# Patient Record
Sex: FEMALE | Race: WHITE | NOT HISPANIC OR LATINO | Employment: FULL TIME | URBAN - METROPOLITAN AREA
[De-identification: names, ages, dates, MRNs, and addresses within clinical notes are randomized per-mention and may not be internally consistent; named-entity substitution may affect disease eponyms.]

---

## 2022-06-22 ENCOUNTER — TELEPHONE (OUTPATIENT)
Dept: SCHEDULING | Facility: IMAGING CENTER | Age: 37
End: 2022-06-22

## 2022-06-26 SDOH — ECONOMIC STABILITY: TRANSPORTATION INSECURITY
IN THE PAST 12 MONTHS, HAS LACK OF RELIABLE TRANSPORTATION KEPT YOU FROM MEDICAL APPOINTMENTS, MEETINGS, WORK OR FROM GETTING THINGS NEEDED FOR DAILY LIVING?: NO

## 2022-06-26 SDOH — ECONOMIC STABILITY: HOUSING INSECURITY
IN THE LAST 12 MONTHS, WAS THERE A TIME WHEN YOU DID NOT HAVE A STEADY PLACE TO SLEEP OR SLEPT IN A SHELTER (INCLUDING NOW)?: NO

## 2022-06-26 SDOH — ECONOMIC STABILITY: HOUSING INSECURITY: IN THE LAST 12 MONTHS, HOW MANY PLACES HAVE YOU LIVED?: 2

## 2022-06-26 SDOH — ECONOMIC STABILITY: TRANSPORTATION INSECURITY
IN THE PAST 12 MONTHS, HAS THE LACK OF TRANSPORTATION KEPT YOU FROM MEDICAL APPOINTMENTS OR FROM GETTING MEDICATIONS?: NO

## 2022-06-26 SDOH — ECONOMIC STABILITY: INCOME INSECURITY: HOW HARD IS IT FOR YOU TO PAY FOR THE VERY BASICS LIKE FOOD, HOUSING, MEDICAL CARE, AND HEATING?: NOT HARD AT ALL

## 2022-06-26 SDOH — HEALTH STABILITY: PHYSICAL HEALTH: ON AVERAGE, HOW MANY MINUTES DO YOU ENGAGE IN EXERCISE AT THIS LEVEL?: 30 MIN

## 2022-06-26 SDOH — ECONOMIC STABILITY: INCOME INSECURITY: IN THE LAST 12 MONTHS, WAS THERE A TIME WHEN YOU WERE NOT ABLE TO PAY THE MORTGAGE OR RENT ON TIME?: NO

## 2022-06-26 SDOH — ECONOMIC STABILITY: FOOD INSECURITY: WITHIN THE PAST 12 MONTHS, THE FOOD YOU BOUGHT JUST DIDN'T LAST AND YOU DIDN'T HAVE MONEY TO GET MORE.: NEVER TRUE

## 2022-06-26 SDOH — ECONOMIC STABILITY: TRANSPORTATION INSECURITY
IN THE PAST 12 MONTHS, HAS LACK OF TRANSPORTATION KEPT YOU FROM MEETINGS, WORK, OR FROM GETTING THINGS NEEDED FOR DAILY LIVING?: NO

## 2022-06-26 SDOH — HEALTH STABILITY: MENTAL HEALTH
STRESS IS WHEN SOMEONE FEELS TENSE, NERVOUS, ANXIOUS, OR CAN'T SLEEP AT NIGHT BECAUSE THEIR MIND IS TROUBLED. HOW STRESSED ARE YOU?: ONLY A LITTLE

## 2022-06-26 SDOH — HEALTH STABILITY: PHYSICAL HEALTH: ON AVERAGE, HOW MANY DAYS PER WEEK DO YOU ENGAGE IN MODERATE TO STRENUOUS EXERCISE (LIKE A BRISK WALK)?: 4 DAYS

## 2022-06-26 SDOH — ECONOMIC STABILITY: FOOD INSECURITY: WITHIN THE PAST 12 MONTHS, YOU WORRIED THAT YOUR FOOD WOULD RUN OUT BEFORE YOU GOT MONEY TO BUY MORE.: NEVER TRUE

## 2022-06-26 ASSESSMENT — LIFESTYLE VARIABLES
SKIP TO QUESTIONS 9-10: 1
HOW MANY STANDARD DRINKS CONTAINING ALCOHOL DO YOU HAVE ON A TYPICAL DAY: 1 OR 2
HOW OFTEN DO YOU HAVE SIX OR MORE DRINKS ON ONE OCCASION: NEVER
HOW OFTEN DO YOU HAVE A DRINK CONTAINING ALCOHOL: 2-3 TIMES A WEEK
AUDIT-C TOTAL SCORE: 3

## 2022-06-26 ASSESSMENT — SOCIAL DETERMINANTS OF HEALTH (SDOH)
HOW MANY DRINKS CONTAINING ALCOHOL DO YOU HAVE ON A TYPICAL DAY WHEN YOU ARE DRINKING: 1 OR 2
HOW OFTEN DO YOU GET TOGETHER WITH FRIENDS OR RELATIVES?: ONCE A WEEK
HOW OFTEN DO YOU ATTENT MEETINGS OF THE CLUB OR ORGANIZATION YOU BELONG TO?: PATIENT DECLINED
HOW OFTEN DO YOU ATTEND CHURCH OR RELIGIOUS SERVICES?: NEVER
WITHIN THE PAST 12 MONTHS, YOU WORRIED THAT YOUR FOOD WOULD RUN OUT BEFORE YOU GOT THE MONEY TO BUY MORE: NEVER TRUE
HOW OFTEN DO YOU HAVE A DRINK CONTAINING ALCOHOL: 2-3 TIMES A WEEK
HOW OFTEN DO YOU HAVE SIX OR MORE DRINKS ON ONE OCCASION: NEVER
DO YOU BELONG TO ANY CLUBS OR ORGANIZATIONS SUCH AS CHURCH GROUPS UNIONS, FRATERNAL OR ATHLETIC GROUPS, OR SCHOOL GROUPS?: NO
HOW OFTEN DO YOU GET TOGETHER WITH FRIENDS OR RELATIVES?: ONCE A WEEK
IN A TYPICAL WEEK, HOW MANY TIMES DO YOU TALK ON THE PHONE WITH FAMILY, FRIENDS, OR NEIGHBORS?: THREE TIMES A WEEK
DO YOU BELONG TO ANY CLUBS OR ORGANIZATIONS SUCH AS CHURCH GROUPS UNIONS, FRATERNAL OR ATHLETIC GROUPS, OR SCHOOL GROUPS?: NO
HOW HARD IS IT FOR YOU TO PAY FOR THE VERY BASICS LIKE FOOD, HOUSING, MEDICAL CARE, AND HEATING?: NOT HARD AT ALL
HOW OFTEN DO YOU ATTEND CHURCH OR RELIGIOUS SERVICES?: NEVER
HOW OFTEN DO YOU ATTENT MEETINGS OF THE CLUB OR ORGANIZATION YOU BELONG TO?: PATIENT DECLINED
IN A TYPICAL WEEK, HOW MANY TIMES DO YOU TALK ON THE PHONE WITH FAMILY, FRIENDS, OR NEIGHBORS?: THREE TIMES A WEEK

## 2022-06-27 ENCOUNTER — OFFICE VISIT (OUTPATIENT)
Dept: MEDICAL GROUP | Facility: LAB | Age: 37
End: 2022-06-27
Payer: COMMERCIAL

## 2022-06-27 VITALS
HEART RATE: 70 BPM | RESPIRATION RATE: 16 BRPM | TEMPERATURE: 98 F | WEIGHT: 171 LBS | OXYGEN SATURATION: 98 % | SYSTOLIC BLOOD PRESSURE: 100 MMHG | BODY MASS INDEX: 25.33 KG/M2 | DIASTOLIC BLOOD PRESSURE: 62 MMHG | HEIGHT: 69 IN

## 2022-06-27 DIAGNOSIS — B35.1 ONYCHOMYCOSIS: ICD-10-CM

## 2022-06-27 DIAGNOSIS — Z76.89 ENCOUNTER TO ESTABLISH CARE: ICD-10-CM

## 2022-06-27 PROBLEM — D62 ACUTE POSTHEMORRHAGIC ANEMIA: Status: ACTIVE | Noted: 2017-06-26

## 2022-06-27 PROCEDURE — 99203 OFFICE O/P NEW LOW 30 MIN: CPT | Performed by: PHYSICIAN ASSISTANT

## 2022-06-27 RX ORDER — FLUCONAZOLE 200 MG/1
200 TABLET ORAL
Qty: 4 TABLET | Refills: 0 | Status: SHIPPED | OUTPATIENT
Start: 2022-06-27 | End: 2023-04-25

## 2022-06-27 RX ORDER — FLUCONAZOLE 200 MG/1
TABLET ORAL
COMMUNITY
Start: 2022-03-18 | End: 2022-06-27 | Stop reason: SDUPTHER

## 2022-06-27 ASSESSMENT — PATIENT HEALTH QUESTIONNAIRE - PHQ9: CLINICAL INTERPRETATION OF PHQ2 SCORE: 0

## 2022-06-27 NOTE — PROGRESS NOTES
"Subjective:     CC:  There were no encounter diagnoses.    HISTORY OF THE PRESENT ILLNESS: Patient is a 37 y.o. female. This pleasant patient is here today to establish care His/her prior PCP was in [facility].    Taking fluconazole for toe fungus  1 tablet per week 200mg x 1 month     planning to get vasectomy -   Will get IUD taken out after 's vasectomy    UTD on labs    Health Maintenance     - Dyslipidemia (30-45): UTD  - Diabetes (HTN, HLD, BMI >25): UTD  - Depression screening (PHQ-2 and/or PHQ-9): neg  - Dental: UTD  - Eye: due ?presbiopia   Diet: \"sweet tooth\" tries to eat balanced diet  Exercise: gym 2-3x week  Substance Use: denies  Tobacco Use/counseling: denies  Safe in relationship: yes  Seat belts, bike helmet, gun safety discussed.  Sun protection used.       Cancer screening  - Cervical CA (21-65): UTD  -  HX Abnormal pap/HPV: none     Infectious disease screening/Immunizations  --Immunizations: UTD      Current Outpatient Medications Ordered in Epic   Medication Sig Dispense Refill   • levonorgestrel (MIRENA) 20 MCG/DAY IUD by Intrauterine route.     • fluconazole (DIFLUCAN) 200 MG Tab        No current Epic-ordered facility-administered medications on file.       Health Maintenance: Completed    ROS:   Gen: no fevers/chills, no changes in weight  Eyes: no changes in vision  ENT: no sore throat, no hearing loss, no bloody nose  Pulm: no sob, no cough  CV: no chest pain, no palpitations  GI: no nausea/vomiting, no diarrhea  : no dysuria  MSk: no myalgias  Skin: no rash  Neuro: no headaches, no numbness/tingling  Heme/Lymph: no easy bruising      Objective:       Exam: /62   Pulse 70   Temp 36.7 °C (98 °F)   Resp 16   Ht 1.753 m (5' 9\")   Wt 77.6 kg (171 lb)   SpO2 98%  Body mass index is 25.25 kg/m².    General: Normal appearing. No distress.  HEENT: Normocephalic. Eyes conjunctiva clear lids without ptosis, pupils equal and reactive to light accommodation, ears normal " shape and contour, canals are clear bilaterally, tympanic membranes are benign, nasal mucosa benign, oropharynx is without erythema, edema or exudates.   Neck: Supple without JVD or bruit. Thyroid is not enlarged.  Pulmonary: Clear to ausculation.  Normal effort. No rales, ronchi, or wheezing.  Cardiovascular: Regular rate and rhythm without murmur. Carotid and radial pulses are intact and equal bilaterally.  Abdomen: Soft, nontender, nondistended. Normal bowel sounds. Liver and spleen are not palpable  Neurologic: Grossly nonfocal  Lymph: No cervical or supraclavicular lymph nodes are palpable  Skin: Warm and dry.  No obvious lesions. Toenail fungus on L 1st and 4th digit  Musculoskeletal: Normal gait. No extremity cyanosis, clubbing, or edema.  Psych: Normal mood and affect. Alert and oriented x3. Judgment and insight is normal.      Assessment & Plan:   37 y.o. female with the following -    1. Encounter to establish care  Labs per orders  Vaccinations per orders  Pt is not due for preventative screenings:     2. Onychomycosis  Chronic condition  CMP reviewed, continue current medication. Advised pt on medication recautions  - fluconazole (DIFLUCAN) 200 MG Tab; Take 1 Tablet by mouth every 7 days.  Dispense: 4 Tablet; Refill: 0      I spent a total of 20 minutes with record review, exam, communication with the patient, communication with other providers, and documentation of this encounter.    No follow-ups on file.    Please note that this dictation was created using voice recognition software. I have made every reasonable attempt to correct obvious errors, but I expect that there are errors of grammar and possibly content that I did not discover before finalizing the note.

## 2023-02-08 ENCOUNTER — OFFICE VISIT (OUTPATIENT)
Dept: MEDICAL GROUP | Facility: LAB | Age: 38
End: 2023-02-08
Payer: COMMERCIAL

## 2023-02-08 VITALS
DIASTOLIC BLOOD PRESSURE: 60 MMHG | WEIGHT: 174 LBS | SYSTOLIC BLOOD PRESSURE: 104 MMHG | HEART RATE: 80 BPM | TEMPERATURE: 98 F | OXYGEN SATURATION: 98 % | HEIGHT: 69 IN | RESPIRATION RATE: 16 BRPM | BODY MASS INDEX: 25.77 KG/M2

## 2023-02-08 DIAGNOSIS — R19.5 LOOSE STOOLS: ICD-10-CM

## 2023-02-08 DIAGNOSIS — M54.6 CHRONIC MIDLINE THORACIC BACK PAIN: ICD-10-CM

## 2023-02-08 DIAGNOSIS — Z63.0 STRESS DUE TO MARITAL PROBLEMS: ICD-10-CM

## 2023-02-08 DIAGNOSIS — G89.29 CHRONIC MIDLINE THORACIC BACK PAIN: ICD-10-CM

## 2023-02-08 PROCEDURE — 99214 OFFICE O/P EST MOD 30 MIN: CPT | Performed by: PHYSICIAN ASSISTANT

## 2023-02-08 RX ORDER — CYCLOBENZAPRINE HCL 5 MG
5 TABLET ORAL 3 TIMES DAILY PRN
Qty: 21 TABLET | Refills: 0 | Status: SHIPPED | OUTPATIENT
Start: 2023-02-08 | End: 2023-02-15

## 2023-02-08 SDOH — SOCIAL STABILITY - SOCIAL INSECURITY: PROBLEMS IN RELATIONSHIP WITH SPOUSE OR PARTNER: Z63.0

## 2023-02-08 ASSESSMENT — PATIENT HEALTH QUESTIONNAIRE - PHQ9: CLINICAL INTERPRETATION OF PHQ2 SCORE: 0

## 2023-02-08 NOTE — PROGRESS NOTES
"Subjective:     CC: several concerns    HPI:   Kenyetta here today with     Back pain  Hx of mild scoliosis. No previous injuries. Usually spasm type pain in thoracic region, worse with bending over. She has tried IcyHot and NSAIDs. Denies radiation, numbness or tingling. Pt has been going to the gym regularly    Runny stools  Symptoms x 1 month. Some bloating/cramping. Sometimes triggered by dairy beverages and oily foods.    Planning for IUD removal in near future, pt is debating with her  about vasectomy vs another IUD    Mood changes  Pt reports some martial stress with her  which occasionally affects her mood. She notes that he has a lot of stress with his own business and does not seem to appreciate her contributions to the household, especially childrearing. She states that she feels safe at home. Declines referral to counseling today    ROS:  Gen: no fevers/chills, no changes in weight  Eyes: no changes in vision  ENT: no sore throat, no hearing loss, no bloody nose  Pulm: no sob, no cough  CV: no chest pain, no palpitations  GI: no nausea/vomiting, no diarrhea  : no dysuria  MSk: no myalgias  Skin: no rash  Neuro: no headaches, no numbness/tingling  Heme/Lymph: no easy bruising    Current Outpatient Medications Ordered in Epic   Medication Sig Dispense Refill    cyclobenzaprine (FLEXERIL) 5 mg tablet Take 1 Tablet by mouth 3 times a day as needed for Muscle Spasms for up to 7 days. 21 Tablet 0    fluconazole (DIFLUCAN) 200 MG Tab Take 1 Tablet by mouth every 7 days. 4 Tablet 0     No current Epic-ordered facility-administered medications on file.         Objective:     Exam:  /60   Pulse 80   Temp 36.7 °C (98 °F)   Resp 16   Ht 1.753 m (5' 9\")   Wt 78.9 kg (174 lb)   SpO2 98%   BMI 25.70 kg/m²  Body mass index is 25.7 kg/m².    Gen: Alert and oriented, No apparent distress.  Neck: Neck is supple without lymphadenopathy.  Lungs: Normal effort, CTA bilaterally, no wheezes, rhonchi, " or rales  CV: Regular rate and rhythm. No murmurs, rubs, or gallops.  Ext: No clubbing, cyanosis, edema.  Back: Full ROM, 5/5 LE strength, sensation intact bilaterally in LE, not tender to palpation over spinous processes, paraspinals neg, SI joint neg, straight leg raise neg, ESPERANZA (hip/SI) test neg      Assessment & Plan:     38 y.o. female with the following -     1. Chronic midline thoracic back pain  Chronic condition  Likely secondary to hx of scoliosis  Trial of muscle relaxers, continue OTC NSAIDS  PT for further eval   - Referral to Physical Therapy  - cyclobenzaprine (FLEXERIL) 5 mg tablet; Take 1 Tablet by mouth 3 times a day as needed for Muscle Spasms for up to 7 days.  Dispense: 21 Tablet; Refill: 0    2. Loose stools  Trial of fiber supplementation as well as eliminating dairy from diet  Discussed red flags and indications for close follow up  F/u PRN    3. Stress due to marital problems  Discussed therapy/counseling options, pt would like to defer at this time, she will contact clinic if she would like a referral     I spent a total of 20 minutes with record review, exam, communication with the patient, communication with other providers, and documentation of this encounter.      Return if symptoms worsen or fail to improve.    Please note that this dictation was created using voice recognition software. I have made every reasonable attempt to correct obvious errors, but there may be errors of grammar and possibly content that I did not discover before finalizing the note.

## 2023-02-15 ENCOUNTER — OFFICE VISIT (OUTPATIENT)
Dept: MEDICAL GROUP | Facility: LAB | Age: 38
End: 2023-02-15
Payer: COMMERCIAL

## 2023-02-15 ENCOUNTER — HOSPITAL ENCOUNTER (OUTPATIENT)
Facility: MEDICAL CENTER | Age: 38
End: 2023-02-15
Attending: PHYSICIAN ASSISTANT
Payer: COMMERCIAL

## 2023-02-15 VITALS
TEMPERATURE: 97.3 F | RESPIRATION RATE: 16 BRPM | BODY MASS INDEX: 25.77 KG/M2 | WEIGHT: 174 LBS | OXYGEN SATURATION: 98 % | HEIGHT: 69 IN | HEART RATE: 68 BPM | DIASTOLIC BLOOD PRESSURE: 60 MMHG | SYSTOLIC BLOOD PRESSURE: 100 MMHG

## 2023-02-15 DIAGNOSIS — Z12.4 CERVICAL CANCER SCREENING: ICD-10-CM

## 2023-02-15 PROCEDURE — 88175 CYTOPATH C/V AUTO FLUID REDO: CPT

## 2023-02-15 PROCEDURE — 87624 HPV HI-RISK TYP POOLED RSLT: CPT

## 2023-02-15 PROCEDURE — 99395 PREV VISIT EST AGE 18-39: CPT | Performed by: PHYSICIAN ASSISTANT

## 2023-02-15 NOTE — PROGRESS NOTES
"Subjective:     CC:   Chief Complaint   Patient presents with    Gynecologic Exam       HPI:   Kenyetta Shelton is a 38 y.o. female who presents for pap smear. She is feeling well and denies any complaints.    Ob-Gyn/ History:    Patient has GYN provider: no  Last Pap Smear:  02/10/2020. no history of abnormal pap smears.  Gyn Surgery:  none.  Current Contraceptive Method:  IUD. currently sexually active.  No significant bloating/fluid retention, pelvic pain, or dyspareunia. No vaginal discharge  Urinary incontinence: denies  Folate intake: n/a      Review of Systems  Constitutional: Negative for fever, chills and malaise/fatigue.   Respiratory: Negative for cough and shortness of breath.  Cardiovascular: Negative for leg swelling.   Gastrointestinal: Negative for nausea, vomiting, abdominal pain and diarrhea.   Genitourinary: Negative for dysuria and hematuria. .      Objective:     /60   Pulse 68   Temp 36.3 °C (97.3 °F)   Resp 16   Ht 1.753 m (5' 9\")   Wt 78.9 kg (174 lb)   SpO2 98%   BMI 25.70 kg/m²   Body mass index is 25.7 kg/m².  Wt Readings from Last 4 Encounters:   02/15/23 78.9 kg (174 lb)   02/08/23 78.9 kg (174 lb)   06/27/22 77.6 kg (171 lb)       Constitutional: Alert, no distress, well-groomed.  Skin: Warm, dry, good turgor, no rashes in visible areas.  Eye: Equal, round and reactive, conjunctiva clear, lids normal.  ENMT: Lips without lesions, good dentition, moist mucous membranes.  Neck: Trachea midline, no masses, no thyromegaly.  Respiratory: Unlabored respiratory effort, no cough.  MSK: Normal gait, moves all extremities.  Neuro: Grossly non-focal.   Psych: Alert and oriented x3, normal affect and mood.  :Perineum and external genitalia normal without rash. Vagina with normal and physiologic discharge. Cervix without visible lesions or discharge. Bimanual exam without adnexal masses or cervical motion tenderness.      Assessment and Plan:     1. Cervical cancer screening  THINPREP " PAP W/HPV           Pap performed today in clinic  Follow up interval pending pap results    Follow-up: No follow-ups on file.

## 2023-02-16 DIAGNOSIS — Z12.4 CERVICAL CANCER SCREENING: ICD-10-CM

## 2023-02-16 LAB — AMBIGUOUS DTTM AMBI4: NORMAL

## 2023-02-17 LAB
CYTOLOGY REG CYTOL: NORMAL
HPV HR 12 DNA CVX QL NAA+PROBE: NEGATIVE
HPV16 DNA SPEC QL NAA+PROBE: NEGATIVE
HPV18 DNA SPEC QL NAA+PROBE: NEGATIVE
SPECIMEN SOURCE: NORMAL

## 2023-02-23 ENCOUNTER — TELEPHONE (OUTPATIENT)
Dept: MEDICAL GROUP | Facility: LAB | Age: 38
End: 2023-02-23
Payer: COMMERCIAL

## 2023-04-01 ENCOUNTER — PATIENT MESSAGE (OUTPATIENT)
Dept: MEDICAL GROUP | Facility: LAB | Age: 38
End: 2023-04-01
Payer: COMMERCIAL

## 2023-04-01 DIAGNOSIS — R06.83 SNORING: ICD-10-CM

## 2023-04-05 DIAGNOSIS — M54.6 CHRONIC MIDLINE THORACIC BACK PAIN: ICD-10-CM

## 2023-04-05 DIAGNOSIS — G89.29 CHRONIC MIDLINE THORACIC BACK PAIN: ICD-10-CM

## 2023-04-17 NOTE — OP THERAPY EVALUATION
"  Outpatient Physical Therapy  INITIAL EVALUATION    [facility] Outpatient Physical Therapy  [address]  [place] [address]  Phone:  [phone]  Fax:  [phone]    Date of Evaluation: 2023    Patient: Kenyetta Shelton  YOB: 1985  MRN: [id number]     Referring Provider: Haley Zhou P.A.-C.  [address] [facility] [address]  [place] [address]   Referring Diagnosis Chronic midline thoracic back pain [M54.6, G89.29]     Time Calculation  Start time: 1400  Stop time: 1445 Time Calculation (min): 45 minutes         Chief Complaint: Back Problem (Chronic midline t/s pain)    Visit Diagnoses     ICD-10-CM   1. Chronic midline thoracic back pain  M54.6    G89.29       Date of onset of impairment: No data found    Subjective:   History of Present Illness:     Mechanism of injury:  Pt is a 38 y.o female presenting to physical therapy with complaints of chronic midline t/s pain. Pt reports that mild mid-back pain is constant, flares up intermittently to a sharp/spasm. Notes the pain has been worse this winter, notes it tweaks when needing to pick something up off the ground occasionally. Pt reports sleep is disturbed due to snoring and pain in th back, tends to roll back and forth frequently. Notes the c/s is recently flared up, unsure what caused this but is experiencing mild pain in low neck region.     Per MD on 2023 :\"Hx of mild scoliosis. No previous injuries. Usually spasm type pain in thoracic region, worse with bending over. She has tried IcyHot and NSAIDs. Denies radiation, numbness or tingling. Pt has been going to the gym regularly\"    Pt denies dizziness, nausea, headaches, numbness/tingling into extremities, and recent visual changes. Pt consents to evaluation and treatment today.     Quality of life:  Good  Sleep disturbance:  Non-restful sleep  Pain:     Current pain ratin    At best pain ratin    At worst pain ratin    " Pain Comments::  Quality: achy, tweaks, spasm     Aggravating Factors: bending fwd to pick something up, shaving legs, vacuuming     Relieving Factors: muscle relaxant (x1 since prescription) hot pad, exercising consistently   Social Support:     Lives with:  Spouse and young children (60+ pounds)  Activities of Daily Living:     Patient reported ADL status: Pt reported ADLs: IND- modifies when in pain  Work: stay at home mom   Exercise: [place] (bike), runs 3-4x a week      Patient Goals:     Patient goals for therapy:  Decreased pain, increased motion and increased strength    Other patient goals:  HEP, prevent future injury, increase flexibility    No past medical history on file.  No past surgical history on file.  Social History     Tobacco Use    Smoking status: Not on file    Smokeless tobacco: Not on file   Substance Use Topics    Alcohol use: Not on file     Family and Occupational History     Socioeconomic History    Marital status:      Spouse name: Not on file    Number of children: Not on file    Years of education: Not on file    Highest education level: Bachelor's degree (e.g., BA, AB, BS)   Occupational History    Not on file       Objective     Observations     Additional Observation Details  Mild t/s scoliosis noted by pt from prior X-ray - not in EMR, not noticeable when shirt is elevated/bending fwd.     Postural Observations  Seated posture: good  Standing posture: fair      Shoulder Screen    Shoulder active range of motion within functional limits.  Shoulder strength within functional limits.  Shoulder strength within functional limits with the following exceptions: R shoulder flexion: 4-/5  L shoulder flexion: 4/5  BL shoulder abduction 4/5      Palpation   Left   Tenderness of the thoracic paraspinals.     Right   Tenderness of the thoracic paraspinals.     Active Range of Motion     Cervical spine: All cervical active range of motion within functional limits  Cervical Spine   Left  "lateral flexion: decreased (20)  Right lateral flexion: decreased (24)    Lumbar   All lumbar active range of motion within functional limits    Joint Play   Spine     Central [place]        T1: WFL with grade 2       T2: WFL with grade 2       T3: WFL with grade 2       T4: hypomobile with grade 2       T5: hypomobile and painful with grade 2       T6: hypomobile and painful with grade 2       T7: WFL and painful with grade 2       T8: painful and WFL with grade 2       T10: painful and hypomobile with grade 2       T11: painful and hypomobile with grade 2       T12: WFL and painful with grade 2       L1: hypomobile with grade 2       L2: WFL with grade 2      General Comments     Spine Comments   T/S rotation AROM: R 20%, L 30%      Therapeutic Exercises (CPT 44472):     1. pt education, Exam Findings; POC    2. SL open books, x10, added to hep    3. UT stretch, 3x 20\", added to hep    4. Seated flashers, x10 5 sec iso pink TB, added to hep      Therapeutic Exercise Summary: Pt performed these exercises with instruction and SPV.  Provided handout of these exercises for daily HEP.     Time-based treatments/modalities:    Physical Therapy Timed Treatment Charges  Therapeutic exercise minutes (CPT 12090): 10 minutes      Assessment, Response and Plan:   Impairments: abnormal or restricted ROM, activity intolerance, impaired physical strength, lacks appropriate home exercise program, limited mobility and pain with function    Assessment details:  Pt is a 38 y.o female presenting to physical therapy with complaints of chronic midline t/s pain. Pt presents with the following impairments: decreased t/s rotation (R>L), decreased c/s AROM with BL SB, hypomobility and pain with  (T4-T6, T10-12), TTP t/s paraspinals (R>L) near T10-T12, BL weakness of shoulder flexion. Pt c/o stiffness/spasms when picking something up off the ground, sustained in flexion (ie. Vacuuming), and when twisting. Pt educated on exam findings " and future POC, pt acknowledged understanding and is agreeable. Pt will benefit from skilled physical therapy to address the following impairments in order to improve functional mobility and overall QOL.  Pt should progress well towards goals if compliant with HEP and POC.     Prognosis: good    Goals:   Short Term Goals:   1. Pt will report independence and compliance with written HEP   2. Pt will show improvement with t/s  to WFL and no increase in s/s to improve t/s mobility  3. Pt will demo improved t/s rotation bilaterally in order to improve functional mobility with ADLs  Short term goal time span:  2-4 weeks      Long Term Goals:    1. Pt will report independence and compliance with written HEP   2. Pt will perform x10 repetitions of deadlift with no increase in s/s and correct form with >/= to 15lbs in order to perform daily tasks at home  3. Pt will report >/= to 80% improvement in sleep quality in order to improve overall sleep hygiene.   4. Pt will have a significant improvement in RMQ with MCID of >/= to 5 points (eval: 16.67)    Long term goal time span:  6-8 weeks    Plan:   Planned therapy interventions:  E Stim Unattended (CPT 60786), Neuromuscular Re-education (CPT 57445), Massage (CPT 90620), Therapeutic Activities (CPT 38052) and Therapeutic Exercise (CPT 76244)  Frequency:  1x week  Duration in weeks:  6  Discussed with:  Patient  Plan details:  UPOC: 06/02/2023    Functional Assessment Used  Marquis Ray Low Back Pain and Disability Score: 16.67     Referring provider co-signature:  I have reviewed this plan of care and my co-signature certifies the need for services.    Certification Period: 04/18/2023 to  06/02/2023    Physician Signature: ________________________________ Date: ______________

## 2023-04-18 ENCOUNTER — PHYSICAL THERAPY (OUTPATIENT)
Dept: PHYSICAL THERAPY | Facility: MEDICAL CENTER | Age: 38
End: 2023-04-18
Attending: PHYSICIAN ASSISTANT
Payer: COMMERCIAL

## 2023-04-18 DIAGNOSIS — M54.6 CHRONIC MIDLINE THORACIC BACK PAIN: ICD-10-CM

## 2023-04-18 DIAGNOSIS — G89.29 CHRONIC MIDLINE THORACIC BACK PAIN: ICD-10-CM

## 2023-04-18 PROCEDURE — 97162 PT EVAL MOD COMPLEX 30 MIN: CPT

## 2023-04-18 PROCEDURE — 97110 THERAPEUTIC EXERCISES: CPT

## 2023-04-18 SDOH — ECONOMIC STABILITY: GENERAL: QUALITY OF LIFE: GOOD

## 2023-04-18 ASSESSMENT — ENCOUNTER SYMPTOMS
PAIN SCALE AT HIGHEST: 7
PAIN SCALE AT LOWEST: 2
PAIN SCALE: 4

## 2023-04-25 ENCOUNTER — OFFICE VISIT (OUTPATIENT)
Dept: MEDICAL GROUP | Facility: LAB | Age: 38
End: 2023-04-25
Payer: COMMERCIAL

## 2023-04-25 VITALS
BODY MASS INDEX: 25.03 KG/M2 | HEIGHT: 69 IN | HEART RATE: 69 BPM | RESPIRATION RATE: 12 BRPM | WEIGHT: 169 LBS | TEMPERATURE: 97.3 F | DIASTOLIC BLOOD PRESSURE: 60 MMHG | OXYGEN SATURATION: 100 % | SYSTOLIC BLOOD PRESSURE: 104 MMHG

## 2023-04-25 DIAGNOSIS — Z30.433 ENCOUNTER FOR IUD REMOVAL AND REINSERTION: ICD-10-CM

## 2023-04-25 PROCEDURE — 58301 REMOVE INTRAUTERINE DEVICE: CPT | Performed by: FAMILY MEDICINE

## 2023-04-25 PROCEDURE — 58300 INSERT INTRAUTERINE DEVICE: CPT | Performed by: FAMILY MEDICINE

## 2023-04-25 NOTE — PROCEDURES
IUD REMOVAL PROCEDURE NOTE:    Indication: Expiration    Speculum placed in the vagina and cervix visualized. IUD strings seen. IUD strings grasped with ring forceps and removed intact. Hemostasis noted. Patient tolerated procedure well without complication.

## 2023-04-25 NOTE — PROCEDURES
IUD INSERTION NOTE:    Indication: Long-term birth control    Benefits, alternatives and risks discussed with patient. Consent form signed.    Bimanual exam performed. Sterile speculum placed with good visualization of the cervix. Cervix cleansed with betadine three times. Anterior lip of cervix grasped with single-tooth tenaculum. Uterus sounded to 7+ cm. No dilation needed. Mirena inserted gently in a normal usual sterile fashion. Strings trimmed to 3 cm. Instruments removed. Patient tolerated procedure well without complication. After-care and return instructions given to patient.

## 2023-04-25 NOTE — PROGRESS NOTES
"Subjective:     Chief Complaint   Patient presents with    IUD Placement     Removal         HPI:   Kenyetta presents today with Iud removal and replacement.   Minimal spotting.    5 year old.   No problems with prior IUD insertions.     Current Outpatient Medications Ordered in Epic   Medication Sig Dispense Refill    fluconazole (DIFLUCAN) 200 MG Tab Take 1 Tablet by mouth every 7 days. 4 Tablet 0     No current Epic-ordered facility-administered medications on file.         ROS:  Gen: no fevers/chills, no changes in weight  Eyes: no changes in vision  ENT: no sore throat, no hearing loss, no bloody nose  Pulm: no sob, no cough  CV: no chest pain, no palpitations  GI: no nausea/vomiting, no diarrhea  : no dysuria  MSk: no myalgias  Skin: no rash  Neuro: no headaches, no numbness/tingling  Heme/Lymph: no easy bruising      Objective:     Exam:  /60 (BP Location: Left arm, Patient Position: Sitting, BP Cuff Size: Adult)   Pulse 69   Temp 36.3 °C (97.3 °F)   Resp 12   Ht 1.753 m (5' 9\")   Wt 76.7 kg (169 lb)   SpO2 100%   BMI 24.96 kg/m²  Body mass index is 24.96 kg/m².    Gen: AAOx3, NAD, well appearing  HEENT: NCAT, EOMI, Nares patent, Mucosa moist  Resp: Normal chest wall rise and fall, not SOB, no tachypnea  Skin: no rash or abnormality of visible skin.   Psych: normal speech, not slurred, good insight, affect full  MSK: Moves all four limbs equally and normally, gait normal      Assessment & Plan:     38 y.o. female with the following -     1. Encounter for IUD removal and reinsertion  See procedure notes.            No follow-ups on file.    Please note that this dictation was created using voice recognition software. I have made every reasonable attempt to correct obvious errors, but I expect that there are errors of grammar and possibly content that I did not discover before finalizing the note.        "

## 2023-04-27 ENCOUNTER — PHYSICAL THERAPY (OUTPATIENT)
Dept: PHYSICAL THERAPY | Facility: MEDICAL CENTER | Age: 38
End: 2023-04-27
Attending: PHYSICIAN ASSISTANT
Payer: COMMERCIAL

## 2023-04-27 DIAGNOSIS — M54.6 CHRONIC MIDLINE THORACIC BACK PAIN: ICD-10-CM

## 2023-04-27 DIAGNOSIS — G89.29 CHRONIC MIDLINE THORACIC BACK PAIN: ICD-10-CM

## 2023-04-27 PROCEDURE — 97140 MANUAL THERAPY 1/> REGIONS: CPT

## 2023-04-27 PROCEDURE — 97110 THERAPEUTIC EXERCISES: CPT

## 2023-04-27 PROCEDURE — 97014 ELECTRIC STIMULATION THERAPY: CPT

## 2023-04-27 NOTE — OP THERAPY DAILY TREATMENT
"  Outpatient Physical Therapy  DAILY TREATMENT     [facility] Outpatient Physical Therapy  [address]  [place] [address]  Phone:  [phone]  Fax:  [phone]    Date: 04/27/2023    Patient: Kenyetta Shelton  YOB: 1985  MRN: [id number]     Time Calculation    Start time: 1315  Stop time: 1405 Time Calculation (min): 50 minutes         Chief Complaint: Back Problem (Mid-low back pain)    Visit #: 2    SUBJECTIVE:  Pt states that she was lifting heavy blocks over the weekend and flared her mid-low back pain up. Notes it is a 5/10.     OBJECTIVE:  Current objective measures:     Palpation   Left   Tenderness of the thoracic paraspinals.      Right   Tenderness of the thoracic paraspinals.      Active Range of Motion      Cervical spine: All cervical active range of motion within functional limits  Cervical Spine   Left lateral flexion: decreased (20)  Right lateral flexion: decreased (24)    T4: hypomobile with grade 2       T5: hypomobile and painful with grade 2       T6: hypomobile and painful with grade 2       T7: WFL and painful with grade 2       T8: painful and WFL with grade 2       T10: painful and hypomobile with grade 2       T11: painful and hypomobile with grade 2      Therapeutic Exercises (CPT 80801):     1. upright bike, x4, cardiovascular warm up    2. SL open books, x10, hep    3. UT stretch, 3x 20\", hep-verbal review    4. Seated flashers, x10 5 sec iso pink TB, hep    5. rows, 2x 10, added to hep    6. TTN with FR, x10, added to hep    7. FR t/s ext, x10, added to hep      Therapeutic Exercise Summary: Pt performed these exercises with instruction and SPV.  Provided handout of these exercises for daily HEP.     Therapeutic Treatments and Modalities:     1. Manual Therapy (CPT 00971), x10 minutes, see below    2. E Stim Unattended (CPT 37695), IFC mid-low back with mhp x10 minutes    Therapeutic Treatment and Modalities Summary:  to " T1-T12, rotational mobs T1-T12   Time-based treatments/modalities:    Physical Therapy Timed Treatment Charges  Manual therapy minutes (CPT 47318): 10 minutes  Therapeutic exercise minutes (CPT 67253): 28 minutes    ASSESSMENT:   Response to treatment: Pt with increased t/s - l/s tightness and decreased mobility today following a weekend of heavy outdoor yard work.  (gr III) to improve t/s mobility; mild tenderness over T10-T12 with hypomobility at T4-T6, T10-T12. Addition of t/s extension and thread the needle with FR to increase t/s mobility. Continue addition of periscapular and postural strength training as tolerated. Plan to observe lifting mechanics next session.     PLAN/RECOMMENDATIONS:   Plan for treatment: therapy treatment to continue next visit.  Planned interventions for next visit: continue with current treatment.

## 2023-06-07 ENCOUNTER — APPOINTMENT (OUTPATIENT)
Dept: PHYSICAL THERAPY | Facility: MEDICAL CENTER | Age: 38
End: 2023-06-07
Attending: PHYSICIAN ASSISTANT
Payer: COMMERCIAL

## 2023-07-12 ENCOUNTER — TELEPHONE (OUTPATIENT)
Dept: PHYSICAL THERAPY | Facility: MEDICAL CENTER | Age: 38
End: 2023-07-12
Payer: COMMERCIAL

## 2023-07-12 NOTE — OP THERAPY DISCHARGE SUMMARY
Outpatient Physical Therapy  DISCHARGE SUMMARY NOTE      [facility] Outpatient Physical Therapy  [address]  [place] [address]  Phone:  [phone]  Fax:  [phone]    Date of Visit: 07/12/2023    Patient: Kenyetta Shelton  YOB: 1985  MRN: [id number]     Referring Provider: No referring provider defined for this encounter.   Referring Diagnosis No admission diagnoses are documented for this encounter.         Functional Assessment Used        Your patient is being discharged from Physical Therapy with the following comments:   Patient has failed to schedule or reschedule follow-up visits    Comments:  Pt was seen in skilled physical therapy for 2 sessions with this provider. Pt has cancelled remaining appointments and failed to schedule follow up appointments.      Limitations Remaining:  Remaining limitations are unknown.     Recommendations:  D/C patient, if pt requests to return to receive additional skilled physical therapy services, please obtain new referral.      Rosa Maria Castro, PT    Date: 7/12/2023

## 2023-07-31 DIAGNOSIS — G89.29 CHRONIC MIDLINE THORACIC BACK PAIN: ICD-10-CM

## 2023-07-31 DIAGNOSIS — M54.6 CHRONIC MIDLINE THORACIC BACK PAIN: ICD-10-CM

## 2023-08-14 ENCOUNTER — APPOINTMENT (OUTPATIENT)
Dept: RADIOLOGY | Facility: MEDICAL CENTER | Age: 38
End: 2023-08-14
Attending: PHYSICIAN ASSISTANT
Payer: COMMERCIAL

## 2023-08-14 DIAGNOSIS — G89.29 CHRONIC MIDLINE THORACIC BACK PAIN: ICD-10-CM

## 2023-08-14 DIAGNOSIS — M54.6 CHRONIC MIDLINE THORACIC BACK PAIN: ICD-10-CM

## 2023-08-14 PROCEDURE — 72040 X-RAY EXAM NECK SPINE 2-3 VW: CPT

## 2023-08-14 PROCEDURE — 72070 X-RAY EXAM THORAC SPINE 2VWS: CPT

## 2023-10-24 ENCOUNTER — OFFICE VISIT (OUTPATIENT)
Dept: SLEEP MEDICINE | Facility: MEDICAL CENTER | Age: 38
End: 2023-10-24
Attending: PHYSICIAN ASSISTANT
Payer: COMMERCIAL

## 2023-10-24 VITALS
HEIGHT: 69 IN | DIASTOLIC BLOOD PRESSURE: 60 MMHG | SYSTOLIC BLOOD PRESSURE: 116 MMHG | WEIGHT: 169 LBS | BODY MASS INDEX: 25.03 KG/M2 | OXYGEN SATURATION: 94 % | HEART RATE: 78 BPM | RESPIRATION RATE: 16 BRPM

## 2023-10-24 DIAGNOSIS — G47.30 BREATHING-RELATED SLEEP DISORDER: ICD-10-CM

## 2023-10-24 DIAGNOSIS — R06.83 SNORING: ICD-10-CM

## 2023-10-24 DIAGNOSIS — G47.8 POOR SLEEP PATTERN: ICD-10-CM

## 2023-10-24 PROCEDURE — 3074F SYST BP LT 130 MM HG: CPT | Performed by: PREVENTIVE MEDICINE

## 2023-10-24 PROCEDURE — 3078F DIAST BP <80 MM HG: CPT | Performed by: PREVENTIVE MEDICINE

## 2023-10-24 PROCEDURE — 99204 OFFICE O/P NEW MOD 45 MIN: CPT | Performed by: PREVENTIVE MEDICINE

## 2023-10-24 PROCEDURE — 99212 OFFICE O/P EST SF 10 MIN: CPT | Performed by: PREVENTIVE MEDICINE

## 2023-10-24 NOTE — PROGRESS NOTES
"CHIEF COMPLIANT: \"Establish care.\"  HISTORY OF PRESENT ILLNESS:  Kenyetta Shelton is a 38 y.o. female kindly referred by Haley Zhou P.A.-C. and  is here for a sleep medicine consultation.     Sleep History:  Kenyetta Shelton has never been tested for sleep apnea. The patient reports snoring, gasping for air  The patient goes to bed at 10 pm and wakes up at 5:00 am. It usually takes the patient approximately 15-30 mins to fall asleep. The patient does not  feel refreshed and does not  nap during the day. The patient has never  used tobacco.   Pt does not use cannabis.  This pt does drink 2-6 alcoholic beverages per week and has 2 caffeinated beverages daily.     The patient denies any symptoms of narcolepsy such as sleep paralysis or cataplexy, or any symptoms that suggest parasomnias such as sleep walking or acting out of dreams.    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    Endorses family members who use PAP therapy/snore:   Mother and father snore    EPWORTH SCORE:    1  /24, this is   consistent with EDS      Significant comorbidities and modifying factors: see below    PAST MEDICAL HISTORY:  History reviewed. No pertinent past medical history.   PROBLEM LIST:  Patient Active Problem List    Diagnosis Date Noted    Onychomycosis 01/07/2019    Anemia due to acute blood loss 06/26/2017     PAST SOCIAL HISTORY:  History reviewed. No pertinent surgical history.  PAST FAMILY HISTORY:  Family History   Problem Relation Age of Onset    Alzheimer's Disease Maternal Grandfather      SOCIAL HISTORY:  Social History     Socioeconomic History    Marital status:      Spouse name: Not on file    Number of children: Not on file    Years of education: Not on file    Highest education level: Bachelor's degree (e.g., BA, AB, BS)   Occupational History    Not on file   Tobacco Use    Smoking status: Never    Smokeless tobacco: Not on file   Vaping Use    Vaping Use: Never used   Substance and Sexual " "Activity    Alcohol use: Yes     Comment: OCC    Drug use: Never    Sexual activity: Not on file   Other Topics Concern    Not on file   Social History Narrative    Not on file     Social Determinants of Health     Financial Resource Strain: Low Risk  (6/26/2022)    Overall Financial Resource Strain (CARDIA)     Difficulty of Paying Living Expenses: Not hard at all   Food Insecurity: No Food Insecurity (6/26/2022)    Hunger Vital Sign     Worried About Running Out of Food in the Last Year: Never true     Ran Out of Food in the Last Year: Never true   Transportation Needs: No Transportation Needs (6/26/2022)    PRAPARE - Transportation     Lack of Transportation (Medical): No     Lack of Transportation (Non-Medical): No   Physical Activity: Insufficiently Active (6/26/2022)    Exercise Vital Sign     Days of Exercise per Week: 4 days     Minutes of Exercise per Session: 30 min   Stress: No Stress Concern Present (6/26/2022)    [employer] [place] [employer] - Occupational Stress Questionnaire     Feeling of Stress : Only a little   Social Connections: Moderately Isolated (6/26/2022)    Social Connection and Isolation Panel [NHANES]     Frequency of Communication with Friends and Family: Three times a week     Frequency of Social Gatherings with Friends and Family: Once a week     Attends Sabianist Services: Never     Active Member of Clubs or Organizations: No     Attends Club or Organization Meetings: Patient refused     Marital Status:    Intimate Partner Violence: Not on file   Housing Stability: Low Risk  (6/26/2022)    Housing Stability Vital Sign     Unable to Pay for Housing in the Last Year: No     Number of Places Lived in the Last Year: 2     Unstable Housing in the Last Year: No     ALLERGIES: Patient has no known allergies.  MEDICATIONS:  No current outpatient medications on file.     No current facility-administered medications for this visit.    \"CURRENT RX\"    REVIEW OF " "SYSTEMS:  Constitutional: Denies weight loss, endorses chronic daytime fatigue --also see HPI    PHYSICAL EXAM/VITALS:  /60 (BP Location: Left arm, Patient Position: Sitting, BP Cuff Size: Adult)   Pulse 78   Resp 16   Ht 1.753 m (5' 9\")   Wt 76.7 kg (169 lb)   SpO2 94%   BMI 24.96 kg/m²   Neck circumference (inches): 14  Appearance: Well-nourished, well-developed,  looks stated age, no acute distress  Eyes:   EOMI  ENMT: Mallampati:3    Neck: Supple, trachea midline  Respiratory effort:  No intercostal retractions or use of accessory muscles  Lung auscultation:  No wheezes rhonchi rubs or rales  Cardiac: No murmurs, rubs, or gallops; regular rhythm, normal rate; no edema  Musculoskeletal:  Grossly normal; gait and station normal  Neurologic:  oriented to person, time, place, and purpose; judgement intact  Psychiatric:  No depression, anxiety, agitation    MEDICAL DECISION MAKING:  The medical record was reviewed as it pertains to this referral. This includes records from primary care,consultants notes, referral request, hospital records, labs and imaging. Any available diagnostic and titration nocturnal polysomnograms, home sleep apnea tests, continuous nocturnal oximetry results, multiple sleep latency tests, and recent compliance reports were reviewed with the patient.    ASSESSMENT/PLAN:  Kenyetta Shelton is a 38 y.o. female  who has a high pretest probability of having obstructive sleep apnea. HST's often underestimate sleep apnea in women. A negative HST should be followed by a laboratory sleep study (polysomnography) if the clinical suspicion for sleep apnea is high. In women, the common co-occurrence of insomnia and BRITTANIE is frequently reported.    DIAGNOSES :    1. Snoring  - Overnight Home Sleep Study; Future    2. Poor sleep pattern  - Overnight Home Sleep Study; Future    3. Breathing-related sleep disorder  - Overnight Home Sleep Study; Future    The patient has signs and symptoms consistent " with obstructive sleep apnea hypopnea syndrome. Will schedule a nocturnal polysomnogram or a home sleep apnea test (please see plan).  The risks of untreated sleep apnea were discussed with the patient at length. Patients with BRITTANIE are at increased risk of cardiovascular disease including coronary artery disease, systemic arterial hypertension, pulmonary arterial hypertension, cardiac arrhythmias, and stroke. BRITTANIE patients have an increased risk of motor vehicle accidents, type 2 diabetes, chronic kidney disease, and non-alcoholic liver disease. The patient was advised to avoid driving a motor vehicle when drowsy.  Have advised the patient to follow up with the appropriate healthcare practitioners for all other medical problems and issues.    RETURN TO CLINIC: Return for 3 weeks after SS - discuss results w/ any provider  or first available with Dr Ferro--reg schedule.    My total time spent caring for the patient on the day of the encounter was 40 minutes. This includes time spent on a thorough chart review including other physician notes, all sleep studies, as well as critical labs and pulmonary and cardiac studies.  Additionally, it includes a thorough discussion of good sleep hygiene and stimulus control, as well as  the need for consistency in terms of sleep preparation and practice.    Please note that this dictation was created using voice recognition software.  I have made every reasonable attempt to correct obvious errors, I expect that there are errors of grammar and possibly content that I did not discover before finalizing this note.

## 2023-11-08 ENCOUNTER — OFFICE VISIT (OUTPATIENT)
Dept: MEDICAL GROUP | Facility: LAB | Age: 38
End: 2023-11-08
Payer: COMMERCIAL

## 2023-11-08 VITALS
HEART RATE: 78 BPM | HEIGHT: 69 IN | DIASTOLIC BLOOD PRESSURE: 70 MMHG | WEIGHT: 176.59 LBS | RESPIRATION RATE: 16 BRPM | BODY MASS INDEX: 26.16 KG/M2 | TEMPERATURE: 97.6 F | OXYGEN SATURATION: 100 % | SYSTOLIC BLOOD PRESSURE: 128 MMHG

## 2023-11-08 DIAGNOSIS — M54.42 ACUTE LEFT-SIDED LOW BACK PAIN WITH LEFT-SIDED SCIATICA: ICD-10-CM

## 2023-11-08 PROCEDURE — 3078F DIAST BP <80 MM HG: CPT | Performed by: PHYSICIAN ASSISTANT

## 2023-11-08 PROCEDURE — 3074F SYST BP LT 130 MM HG: CPT | Performed by: PHYSICIAN ASSISTANT

## 2023-11-08 PROCEDURE — 99214 OFFICE O/P EST MOD 30 MIN: CPT | Performed by: PHYSICIAN ASSISTANT

## 2023-11-08 RX ORDER — CYCLOBENZAPRINE HCL 10 MG
10 TABLET ORAL 3 TIMES DAILY PRN
COMMUNITY
End: 2023-11-08

## 2023-11-08 RX ORDER — KETOROLAC TROMETHAMINE 30 MG/ML
30 INJECTION, SOLUTION INTRAMUSCULAR; INTRAVENOUS ONCE
Status: COMPLETED | OUTPATIENT
Start: 2023-11-08 | End: 2023-11-08

## 2023-11-08 RX ORDER — METHOCARBAMOL 500 MG/1
1000 TABLET, FILM COATED ORAL 2 TIMES DAILY PRN
Qty: 28 TABLET | Refills: 0 | Status: SHIPPED | OUTPATIENT
Start: 2023-11-08 | End: 2023-11-22

## 2023-11-08 RX ADMIN — KETOROLAC TROMETHAMINE 30 MG: 30 INJECTION, SOLUTION INTRAMUSCULAR; INTRAVENOUS at 15:29

## 2023-11-08 NOTE — PROGRESS NOTES
"Subjective:     CC: Low back pain with sciatica    HPI:   Kenyetta here today with     Pt reports she developed low back pain after putting away Halloween decorations.  She describes as a sharp stabbing pain in the buttock.  Endorses numbness/tingling sensation down the leg. Worse with sitting for extended periods  Currently taking ibuprofen and flexeril with minimal improvemnet. Also using heat/ice with minimal improvement  Patient does note history of low back pain but states that this recent bout of back pain is different, particularly the numbness/tingling sensation in her leg.  Denies saddle paresthesia, bowel or bladder changes, fevers          ROS:  All ROS negative except for pertinent positives listed above.       Current Outpatient Medications Ordered in Epic   Medication Sig Dispense Refill    methocarbamol (ROBAXIN) 500 MG Tab Take 2 Tablets by mouth 2 times a day as needed (pain) for up to 14 days. 28 Tablet 0     No current Epic-ordered facility-administered medications on file.         Objective:     Exam:  /70 (BP Location: Right arm, Patient Position: Sitting, BP Cuff Size: Adult)   Pulse 78   Temp 36.4 °C (97.6 °F) (Temporal)   Resp 16   Ht 1.753 m (5' 9\")   Wt 80.1 kg (176 lb 9.4 oz)   SpO2 100%   BMI 26.08 kg/m²  Body mass index is 26.08 kg/m².    Gen: Alert and oriented, No apparent distress.  Neck: Neck is supple without lymphadenopathy.  Lungs: Normal effort, CTA bilaterally, no wheezes, rhonchi, or rales  CV: Regular rate and rhythm. No murmurs, rubs, or gallops.  Ext: No clubbing, cyanosis, edema.  Back: Full ROM, 5/5 LE strength, sensation intact bilaterally in LE, not tender to palpation over spinous processes, paraspinals negative, SI joint tender to palpation on the left side, straight leg raise positive on left side, ESPERANZA (hip/SI) test negative.  Weakness with flexion/extension of the foot noted      Assessment & Plan:     38 y.o. female with the following -     1. Acute " left-sided low back pain with left-sided sciatica  New problem  Patient does endorse history of chronic low back pain although at this time new onset of neurological symptoms including left foot weakness does warrant further evaluation with MRI.  Patient has not exhibited any improvement with conservative therapy including NSAIDs, muscle relaxers, heat, ice, stretching.  Patient is is been referred to physiatry for further evaluation.  Discussed red flags indications for close follow-up  - MR-LUMBAR SPINE-W/O; Future  - Referral to Pain Clinic  - ketorolac (Toradol) injection 30 mg        I spent a total of 20 minutes with record review, exam, communication with the patient, communication with other providers, and documentation of this encounter.      Return for After imaging.    Please note that this dictation was created using voice recognition software. I have made every reasonable attempt to correct obvious errors, but there may be errors of grammar and possibly content that I did not discover before finalizing the note.

## 2023-11-27 ENCOUNTER — APPOINTMENT (OUTPATIENT)
Dept: PHYSICAL MEDICINE AND REHAB | Facility: MEDICAL CENTER | Age: 38
End: 2023-11-27
Payer: COMMERCIAL

## 2023-12-04 ENCOUNTER — APPOINTMENT (OUTPATIENT)
Dept: RADIOLOGY | Facility: MEDICAL CENTER | Age: 38
End: 2023-12-04
Attending: PHYSICIAN ASSISTANT
Payer: COMMERCIAL

## 2023-12-05 ENCOUNTER — OFFICE VISIT (OUTPATIENT)
Dept: PHYSICAL MEDICINE AND REHAB | Facility: MEDICAL CENTER | Age: 38
End: 2023-12-05
Payer: COMMERCIAL

## 2023-12-05 VITALS
BODY MASS INDEX: 25.71 KG/M2 | HEART RATE: 78 BPM | HEIGHT: 69 IN | OXYGEN SATURATION: 98 % | SYSTOLIC BLOOD PRESSURE: 106 MMHG | DIASTOLIC BLOOD PRESSURE: 60 MMHG | WEIGHT: 173.6 LBS | TEMPERATURE: 97.9 F

## 2023-12-05 DIAGNOSIS — M47.817 FACET ARTHROPATHY, LUMBOSACRAL: ICD-10-CM

## 2023-12-05 DIAGNOSIS — M47.812 FACET ARTHROPATHY, CERVICAL: ICD-10-CM

## 2023-12-05 DIAGNOSIS — M51.34 THORACIC DEGENERATIVE DISC DISEASE: ICD-10-CM

## 2023-12-05 DIAGNOSIS — G25.89 SCAPULAR DYSKINESIS: ICD-10-CM

## 2023-12-05 DIAGNOSIS — M47.812 CERVICAL SPONDYLOSIS: ICD-10-CM

## 2023-12-05 DIAGNOSIS — Z71.3 DIETARY COUNSELING: ICD-10-CM

## 2023-12-05 DIAGNOSIS — Z71.82 EXERCISE COUNSELING: ICD-10-CM

## 2023-12-05 DIAGNOSIS — M47.9 SPONDYLOSIS: ICD-10-CM

## 2023-12-05 DIAGNOSIS — M43.12 SPONDYLOLISTHESIS, CERVICAL REGION: ICD-10-CM

## 2023-12-05 DIAGNOSIS — M43.16 SPONDYLOLISTHESIS OF LUMBAR REGION: ICD-10-CM

## 2023-12-05 DIAGNOSIS — G89.29 CHRONIC LEFT-SIDED LOW BACK PAIN WITH LEFT-SIDED SCIATICA: ICD-10-CM

## 2023-12-05 DIAGNOSIS — M54.42 CHRONIC LEFT-SIDED LOW BACK PAIN WITH LEFT-SIDED SCIATICA: ICD-10-CM

## 2023-12-05 PROCEDURE — 1125F AMNT PAIN NOTED PAIN PRSNT: CPT | Performed by: GENERAL PRACTICE

## 2023-12-05 PROCEDURE — 99204 OFFICE O/P NEW MOD 45 MIN: CPT | Performed by: GENERAL PRACTICE

## 2023-12-05 PROCEDURE — 3078F DIAST BP <80 MM HG: CPT | Performed by: GENERAL PRACTICE

## 2023-12-05 PROCEDURE — 3074F SYST BP LT 130 MM HG: CPT | Performed by: GENERAL PRACTICE

## 2023-12-05 RX ORDER — GABAPENTIN 100 MG/1
100-300 CAPSULE ORAL NIGHTLY PRN
Qty: 90 CAPSULE | Refills: 3 | Status: CANCELLED | OUTPATIENT
Start: 2023-12-05

## 2023-12-05 ASSESSMENT — PATIENT HEALTH QUESTIONNAIRE - PHQ9
5. POOR APPETITE OR OVEREATING: 1 - SEVERAL DAYS
CLINICAL INTERPRETATION OF PHQ2 SCORE: 1
SUM OF ALL RESPONSES TO PHQ QUESTIONS 1-9: 4

## 2023-12-05 ASSESSMENT — PAIN SCALES - GENERAL: PAINLEVEL: 3=SLIGHT PAIN

## 2023-12-05 NOTE — PROGRESS NOTES
Physiatry (Physical Medicine and  Rehabilitation)     Date of service: See epic    Chief complaint:   Chief Complaint   Patient presents with    Establish Care     Acute L-sided lbp with L-sided Sciatica        Referring provider: Haley Zhou P.*    HISTORY    Kenyetta Shelton is a 38 y.o. year old RHD very pleasant female with past medical history of anemia who presents with a referral for acute left-sided low back pain with left-sided sciatica      Medical records review:  I reviewed the note from the referring provider Haley Zhou P.* including the note dated 11/8/2023.    Prior Procedures:   none    HPI:    Acute on chronic low back pain. Currently improving. Reported tingling and numbness sensation posterior left leg from gluteal region to ankle.  Achiness midline back and of neck.  Intermittent back spasms and neck stiffness.  Pain level currently 3/10 formally 9/10.  Pain is worse in the mornings at 5/10 but improved to 3/10 in the afternoon and 2/10 in the evening and bedtime.  Pain relieved with standing, walking, and laying down.  Pain worse with sitting, bending forward, and side bending and twisting.  Questionable changes with bending backward and walking upstairs.  I received Toradol injection and Robaxin last month with some relief.  Hot pad and ibuprofen OTC.  Has trialed Flexeril 6 months ago.  Stated that she is involved with physical therapy with little relief at [facility] in April and in private practice and [facility] with more active sessions started last month. Advised by sibling RN to stretch and exercise. Would like to try Yoga. Only 1 adjustment with chiropractor but little effect.   Neck pain stable.     ROS:   Red Flags ROS:   Fever, Chills, Sweats: Denies  Involuntary Weight Loss: Denies  Bladder Incontinence: Denies  Bowel Incontinence: denies  Saddle Anesthesia: Denies  Falls: denies  progressive motor weakness: denies  All other systems reviewed and negative.          12/5/2023     9:40 AM 2/8/2023    10:20 AM 6/27/2022     8:40 AM   PHQ-9 Screening   Little interest or pleasure in doing things 0 - not at all 0 - not at all 0 - not at all   Feeling down, depressed, or hopeless 1 - several days 0 - not at all 0 - not at all   Trouble falling or staying asleep, or sleeping too much 0 - not at all     Feeling tired or having little energy 1 - several days     Poor appetite or overeating 1 - several days     Feeling bad about yourself - or that you are a failure or have let yourself or your family down 1 - several days     Trouble concentrating on things, such as reading the newspaper or watching television 0 - not at all     Moving or speaking so slowly that other people could have noticed. Or the opposite - being so fidgety or restless that you have been moving around a lot more than usual 0 - not at all     Thoughts that you would be better off dead, or of hurting yourself in some way 0 - not at all     PHQ-2 Total Score 1 0 0   PHQ-9 Total Score 4         Interpretation of PHQ-9 Total Score   Score Severity   1-4 No Depression   5-9 Mild Depression   10-14 Moderate Depression   15-19 Moderately Severe Depression   20-27 Severe Depression      FUNCTIONAL history: RIGHT handed, no issues driving or performing ADLs,?no ADs,?lives?with?family   OCCUPATIONAL history: housewife   SOCIAL history: never smoker;?never drinker       GOALS OF TREATMENT: Symptom/Pain relief. improve function.        PMHx:   No past medical history on file.    PSHx:   No past surgical history on file.    Family history   Family History   Problem Relation Age of Onset    Alzheimer's Disease Maternal Grandfather        Medications: reviewed on [facility].   No outpatient medications have been marked as taking for the 12/5/23 encounter (Office Visit) with Mike Marroquin D.O..        Allergies:   No Known Allergies    Social Hx:   Social History     Socioeconomic History    Marital status:      Spouse name: Not  on file    Number of children: Not on file    Years of education: Not on file    Highest education level: Bachelor's degree (e.g., BA, AB, BS)   Occupational History    Not on file   Tobacco Use    Smoking status: Never    Smokeless tobacco: Not on file   Vaping Use    Vaping Use: Never used   Substance and Sexual Activity    Alcohol use: Yes     Comment: OCC    Drug use: Never    Sexual activity: Not on file   Other Topics Concern    Not on file   Social History Narrative    Not on file     Social Determinants of Health     Financial Resource Strain: Low Risk  (6/26/2022)    Overall Financial Resource Strain (CARDIA)     Difficulty of Paying Living Expenses: Not hard at all   Food Insecurity: No Food Insecurity (6/26/2022)    Hunger Vital Sign     Worried About Running Out of Food in the Last Year: Never true     Ran Out of Food in the Last Year: Never true   Transportation Needs: No Transportation Needs (6/26/2022)    PRAPARE - Transportation     Lack of Transportation (Medical): No     Lack of Transportation (Non-Medical): No   Physical Activity: Insufficiently Active (6/26/2022)    Exercise Vital Sign     Days of Exercise per Week: 4 days     Minutes of Exercise per Session: 30 min   Stress: No Stress Concern Present (6/26/2022)    [employer] [place] - Occupational Stress Questionnaire     Feeling of Stress : Only a little   Social Connections: Moderately Isolated (6/26/2022)    Social Connection and Isolation Panel [NHANES]     Frequency of Communication with Friends and Family: Three times a week     Frequency of Social Gatherings with Friends and Family: Once a week     Attends Evangelical Services: Never     Active Member of Clubs or Organizations: No     Attends Club or Organization Meetings: Patient refused     Marital Status:    Intimate Partner Violence: Not on file   Housing Stability: Low Risk  (6/26/2022)    Housing Stability Vital Sign     Unable to Pay for Housing in the  "Last Year: No     Number of Places Lived in the Last Year: 2     Unstable Housing in the Last Year: No         EXAMINATION   Vitals: /60 (BP Location: Left arm, Patient Position: Sitting, BP Cuff Size: Large adult)   Pulse 78   Temp 36.6 °C (97.9 °F) (Temporal)   Ht 1.753 m (5' 9\")   Wt 78.7 kg (173 lb 9.6 oz)   SpO2 98%   Physical Exam:     Body Habitus: Body mass index is 25.64 kg/m².  Appearance: Well-groomed, well-nourished, not disheveled  Eyes: No scleral icterus to suggest severe liver disease, no proptosis to suggest severe hyperthyroid  ENT -no obvious auditory deficits, no external lesions, moist mucus membranes   Skin -no rashes or lesions noted. No appreciable skin breakdown on exposed skin areas.    Respiratory-  breathing comfortably on room air, no audible wheezing, full sentences  Cardiovascular- No lower extremity edema noted.   Psychiatric- alert and oriented, calm, comfortable, cooperative     Musculoskeletal and Neuro:  Gait and station - normal gait with reciprocal pattern,  no use of ambulatory device, no arm assistance with sit-to-stand, nonantalgic. no loss of balance.  No change in patient's demeanor with exam.    Grossly normal cranial nerve exam and sensory exam grossly  Coordination grossly intact  Single leg balance / Trendelenburg: very mildly limited in balance and control negative right, positive left  Shoulder: early activation with scapula with protracted shoulders    Cervical spine   Inspection: No deformities of the skin over the cervical spine. No rashes or lesions.  full   active range of motion in all directions, without  pain    Spurling’s sign: negative bilaterally  No signs of muscular atrophy in bilateral upper extremities   No tenderness to palpation of the cervical spine  tenderness to palpation on the BILATERAL side in the cervical facets.   No tenderness of paraspinal muscles  Key points for the international standards for neurological classification of " spinal cord injury (ISNCSCI) to light touch.   Dermatome R L   C4 2 2   C5 2 2   C6 2 2   C7 2 2   C8 2 2   T1 2 2   T2 2 2     Nerve: neurovascularly intact radial, median, ulnar, and AIN     Motor Exam Upper Extremities   ? Myotome R L   Shoulder flexion C5 5 5   Elbow flexion C5 5 5   Wrist extension C6 5 5   Elbow extension C7 5 5   Finger flexion C8 5 5   Finger abduction T1 5 5     Reflexes  ?  R L   Biceps  3+ 3+   Brachioradialis  3+ 3+   Carlson’s sign negative bilaterally       Thoracic/Lumbar Spine/Sacral Spine/Hips   Inspection: No evidence of atrophy in bilateral lower extremities throughout     ROM: full  active range of motion with flexion, lateral flexion, and rotation bilaterally.   There is full  active range of motion with lumbar extension without pain.    There is no pain with facet loading maneuver (extension rotation) with axial low back pain on the BILATERAL side(s)    Palpation:   No tenderness to palpation in midline at T1-T12 levels. No tenderness to palpation in the left and right of the midline T1-L5  palpation over SI joint: negative bilaterally  palpation in hip or over the gluteus medius tendon insertion: negative bilaterally     Lumbar spine Special tests  Neuro tension  Straight leg test negative bilaterally     HIP  Gaenslen test negative bilaterally  FAIR test negative bilaterally   Range of motion in the RIGHT hip is full  in flexion, extension, abduction, internal rotation, external rotation.  Range of motion in the LEFT hip is full  in flexion, extension, abduction, internal rotation, external rotation.  Grant negative bilaterally  Hamstring tightness with forward flexion   Finger-to-Floor Distance in maximal forward flexion 6 inches      SI joint tests  Observation patient sits on one buttocks: Negative  SI joint compression negative bilaterally    SI joint distraction negative bilaterally  Thigh thrust test negative bilaterally   GRANT test negative bilaterally    Key points  "for the international standards for neurological classification of spinal cord injury (ISNCSCI) to light touch.   Dermatome R L   L2 2 2   L3 2 2   L4 1 2   L5 1 2   S1 2 2     Motor Exam Lower Extremities  ? Myotome R L   Hip flexion L2 5 5   Knee extension L3 5 5   Ankle dorsiflexion L4 5 5   Toe extension L5 5 5   Ankle plantarflexion S1 5 5   Hip Abduction  5 5   Hip Adduction  5 5     Reflexes  Clonus of the ankle negative bilaterally   ? R L   Patella 3+ 3+   Achilles  3+ 3+     MEDICAL DECISION MAKING    Medical records review: see under HPI section.     DATA    Labs:   Lab Results   Component Value Date/Time    SODIUM 140 03/18/2022 01:12 PM    POTASSIUM 4.2 03/18/2022 01:12 PM    CHLORIDE 104 03/18/2022 01:12 PM    CO2 28 03/18/2022 01:12 PM    CREATININE 0.91 03/18/2022 01:12 PM    CALCIUM 9.3 03/18/2022 01:12 PM    ALTSGPT 14 03/18/2022 01:12 PM   ]    No results found for: \"PROTHROMBTM\", \"INR\"     Lab Results   Component Value Date/Time    HEMOGLOBIN 12.7 03/18/2022 01:12 PM    HEMATOCRIT 39.4 03/18/2022 01:12 PM    MCV 94 03/18/2022 01:12 PM    NEUTSPOLYS 58 03/18/2022 01:12 PM    LYMPHOCYTES 33 03/18/2022 01:12 PM    MONOCYTES 7 03/18/2022 01:12 PM    EOSINOPHILS 2 03/18/2022 01:12 PM    BASOPHILS 0 03/18/2022 01:12 PM        Lab Results   Component Value Date/Time    HBA1C 5.3 03/18/2022 01:12 PM        Imaging:   I personally reviewed following images, these are my reads  Cervical x-ray 8/14/2023: Straightening of cervical lordosis, degenerative changes, none flowing osteophytes present, mild anterolisthesis C7  Thoracic x-ray 8/14/2023: Degenerative changes, mild endplate changes    Lumbar imaging on her phone: mild spondylolisthesis, degenerative, facet arthropathy    IMAGING radiology reads. I reviewed the following radiology reads                                           Results for orders placed in visit on 08/14/23    DX-CERVICAL SPINE-2 OR 3 VIEWS    Impression  Degenerative disc disease, " most at C5-6 and C6-C7. Focal reversal of the normal cervical lordosis and C5-6                                      Results for orders placed in visit on 23    DX-THORACIC SPINE-2 VIEWS    Impression  Mild degenerative change of the thoracic spine.            Diagnosis   Visit Diagnoses     ICD-10-CM   1. Facet arthropathy, lumbosacral  M47.817   2. Spondylolisthesis of lumbar region  M43.16   3. Chronic left-sided low back pain with left-sided sciatica  M54.42    G89.29   4. Spondylosis  M47.9   5. Spondylolisthesis, cervical region  M43.12   6. Facet arthropathy, cervical  M47.812   7. Thoracic degenerative disc disease  M51.34   8. Cervical spondylosis  M47.812   9. Scapular dyskinesis  G25.89   10. Dietary counseling  Z71.3   11. Exercise counseling  Z71.82       ASSESSMENT AND PLAN:  Kenyetta Shelton 38 y.o. female  Patient with historical and clinical evidence consistent with lumbar radiculitis likely S1.  Currently improving with active physical therapy for the past month.  Using over-the-counter medications.  Numbness and tingling posterior left leg with no deficits of manual motor testing.  Negative Spurling's test.  No red flags.  Imaging obtained at her chiropractor and requested records.  Advised patient to continue conservative management plan and would like to hold off on injections and surgery as long as possible.  Differential also may include facet arthropathy, and degenerative changes, and mild spondylolisthesis.  Chronic neck pain: Stable; no radiation or referred pain.  Past x-ray revealed degenerative changes, spondylolisthesis, and facet arthropathy; provided home exercise program  Vitamin D screenin in 2022; recommend to repeat  PHQ9 negative; recent death of father; consider   Dietary and exercising counseling discussed.  Extensive discussion regarding treatment options, at this time recommending the following:    No orders of the defined types were placed in this  encounter.        PLAN  I did have an extensive discussion regarding pathology and treatment options with the patient today including medications, injections, physical agents (eg. water, heat, cold, TENS), therapy-based programs (eg. massage, stretching/traction, exercise), alternative modalities (eg. Acupuncture, OMT, chiropractor, etc), and lastly surgical intervention:  -Medications/Modalities:   -Tylenol/acetaminophen 975mg (for example, 3 tabs of 325mg, or 2 tabs of 500mg) every 6-8 hours as needed for mild/moderate pain.  Do not take more than 3000mg of Tylenol in 24 hours.   NSAIDS for severe pain   -Ibuprofen 600mg (1 tab of 600mg-800mg) every 6-8 hours (take with food) for 14 days and as needed.  Do not take more than 2400 mg of ibuprofen in 24 hours. Take with milk or with food.  do not take with other NSAIDs such as naproxen, , Celebrex, meloxicam.  -You may also try ice packs or heating pads to help with pain for no more than 15 minutes at a time  -May need to consider gabapentin if worsening  -Limitations:    Activity as tolerated and Prolonged immobilization may worsen condition. attempt to walk  -Brace/orthotic/assistive devices: Not indicated at this time  -Therapy (PT/OT/Aquatherapy): Continue to focus on strengthening and stretching and may include physical agents (eg. water, heat, cold, TENS) and/or therapy-based programs (eg. massage, stretching/traction, exercise),  -Home exercise program: encouraged and demonstrated home exercises of regular strengthening and stretching, such as single leg balance exercises and shoulder retraction exercises  -Office Injections: not indicated at this time  -Diagnostic workup: reviewed today as above  -Interventional program: I would  consider the patient for an epidural steroid injection depending on the results of the above   -Referrals: none   -Outside records requested:  The patient signed Outside Records Request Form for her outside records including images.  This includes the records from [facility]    Follow-up:    Return to clinic in 12 weeks for follow-up of symptomatology, or sooner as needed for any acute issues.  Patient expressed understanding of the management plan. Patient (and Family Members) was/were encouraged to call if any worries, issues, problems or concerns prior to the next visit     Please note that this dictation was created using voice recognition software. I have made every reasonable attempt to correct obvious errors but there may be errors of grammar and content that I may have overlooked prior to finalization of this note.      Mike Marroquin, DO  Physical Medicine and Rehabilitation  [facility]         JOEY Zhou, P.A.-ALFONZO.   CC Haley Zhou P.*

## 2023-12-05 NOTE — PATIENT INSTRUCTIONS
"Pain control:   -Tylenol/acetaminophen 975mg (for example, 3 tabs of 325mg, or 2 tabs of 500mg) every 6-8 hours as needed for mild/moderate pain.  Do not take more than 3000mg of Tylenol in 24 hours.   NSAIDS for severe pain   -Ibuprofen 600mg (1 tab of 600mg-800mg) every 6-8 hours (take with food) for 14 days and as needed.  Do not take more than 2400 mg of ibuprofen in 24 hours. Take with milk or with food.  do not take with other NSAIDs such as naproxen, , Celebrex, meloxicam.  -You may also try ice packs or heating pads to help with pain for no more than 15 minutes at a time      Diet  \"-Emphasizes fruits, vegetables, whole grains, and fat-free or low-fat milk and milk products  -Includes a variety of protein foods such as seafood, lean meats and poultry, eggs, legumes (beans and peas), soy products, nuts, and seeds.  -Is low in added sugars, sodium, saturated fats, trans fats, and cholesterol.  -Stays within your daily calorie needs\"  https://www.cdc.gov/healthyweight/healthy_eating/index.html    Exercise  \"We know 150 minutes of physical activity each week sounds like a lot, but you don’t have to do it all at once. It could be 30 minutes a day, 5 days a week. You can spread your activity out during the week and break it up into smaller chunks of time.\"  https://www.cdc.gov/physicalactivity/basics/adults/index.htm  \"Exercise, multidisciplinary rehabilitation, acupuncture, CBT, and mind-body practices were most consistently associated with durable slight to moderate improvements in function and pain for specific chronic pain conditions. \"  https://effectivehealthcare.ahrq.gov/products/nonpharma-treatment-pain/research-2018         "

## 2023-12-06 ENCOUNTER — PATIENT MESSAGE (OUTPATIENT)
Dept: PHYSICAL MEDICINE AND REHAB | Facility: MEDICAL CENTER | Age: 38
End: 2023-12-06
Payer: COMMERCIAL

## 2023-12-07 ENCOUNTER — HOME STUDY (OUTPATIENT)
Dept: SLEEP MEDICINE | Facility: MEDICAL CENTER | Age: 38
End: 2023-12-07
Attending: PREVENTIVE MEDICINE
Payer: COMMERCIAL

## 2023-12-07 DIAGNOSIS — G47.30 BREATHING-RELATED SLEEP DISORDER: ICD-10-CM

## 2023-12-07 DIAGNOSIS — R06.83 SNORING: ICD-10-CM

## 2023-12-07 DIAGNOSIS — G47.8 POOR SLEEP PATTERN: ICD-10-CM

## 2023-12-07 PROCEDURE — 95800 SLP STDY UNATTENDED: CPT | Performed by: PREVENTIVE MEDICINE

## 2023-12-20 NOTE — PROCEDURES
DIAGNOSTIC HOME SLEEP TEST (HST) REPORT WatchPAT      PATIENT ID:  NAME:  Kenyetta Shelton  MRN:               [id number]  YOB: 1985  DATE OF STUDY:12/07/2023       Impression:     This study shows evidence of:      1. No significant obstructive sleep apnea with PAT apnea hyponea index (pAHI 3%) of 4.5 per hour.  PAT respiratory disturbance index (pRDI) was 8.6 per hour. These findings are based on 7 channels recording of PAT signal with sleep staging, heart rate, pulse oximetry, actigraphy, body position, snoring and respiratory movement.     2. Oxygenation O2 Sat. mean O2 sat was 93%,  km was 90%,  and maximum O2 at 98 %. O2 sat was at or  below 88% for 0.0 min of evaluation time. Oxygen Desaturation (4%) Index was elevated at 1.4/hr.  PAT apnea hyponea index (pAHI 4%) of 2.1 per hour.  AVG HR was 70 BPM.      TECHNICAL DESCRIPTION: Patient underwent home sleep apnea testing with peripheral arterial tone signal (WatchPAT™). This is a Type IV portable monitor and device. Monitoring was done with 7 channels recording of PAT signal with sleep staging, heart rate, pulse oximetry, actigraphy, body position, snoring and respiratory movement. Prior to using the device, the patient received verbal and written instructions for its application and was provided with the help desk phone number for additional telephonic instruction with 24-hour availability of qualified personnel to answer questions.    AHI  vs RDI:  The pRDI is calculated in a very similar way as the pAHI but an additional type of respiratory events named RERA are also counted. RERA  is the abbreviation for respiratory effort related arousal and is essentially a very short arousal of a few seconds that follows partial occlusion of the airway.  The normal range of the RDI score is also less than 5/hour.    General sleep summary: . Total recording time is 8 hours and 20 minutes and approximate sleep time is 7 hours and 46 minutes. The  patient spent 57.5 minutes in the supine position and 409 minutes in the nonsupine position.  Supine AHI (3%) 20.9.    Recommendations:    According to strict standards that is an AHI greater than 5.0, this patient does not meet criteria for BRITTANIE treatment.  However, her RDI is elevated at 8.6 and her supine AHI is 20.9.  At the very least this patient needs to be advised to use a sleep position device that will prevent supine sleep.  She may need other treatment as well.  Clinical correlation is required.

## 2024-01-26 ENCOUNTER — TELEMEDICINE (OUTPATIENT)
Dept: SLEEP MEDICINE | Facility: MEDICAL CENTER | Age: 39
End: 2024-01-26
Attending: PREVENTIVE MEDICINE
Payer: COMMERCIAL

## 2024-01-26 VITALS — BODY MASS INDEX: 25.62 KG/M2 | HEIGHT: 69 IN | WEIGHT: 173 LBS

## 2024-01-26 DIAGNOSIS — G47.39 POSITIONAL SLEEP APNEA: ICD-10-CM

## 2024-01-26 PROCEDURE — 99215 OFFICE O/P EST HI 40 MIN: CPT | Performed by: PREVENTIVE MEDICINE

## 2024-01-26 NOTE — PROGRESS NOTES
"This evaluation was conducted via Zoom using secure and encrypted video conferencing technology.  The patient was in a private location in the [facility].  The patient's identity was confirmed and verbal consent was obtained for this virtual visit.    CHIEF COMPLIANT: \"How did I do on my sleep study?\"   LAST SEEN:  Dr. Ferro on 10/24/2023  HISTORY OF PRESENT ILLNESS:  Kenyetta Shelton is a 39 y.o.female who is here for sleep study results.      Sleep study results:   TYPE:  [facility] HST  DATE:  12/7/2023  Diagnostic:AHI: 4.5, supine AHI: 21  Diagnostic  Oxygen Kit: 90% with 0.0 mins at or under 88%     Significant comorbidities and modifying factors: see below     PAST MEDICAL HISTORY:  No past medical history on file.   PROBLEM LIST:  Patient Active Problem List    Diagnosis Date Noted    Onychomycosis 01/07/2019    Anemia due to acute blood loss 06/26/2017     PAST SOCIAL HISTORY:  No past surgical history on file.  PAST FAMILY HISTORY:  Family History   Problem Relation Age of Onset    Alzheimer's Disease Maternal Grandfather      SOCIAL HISTORY:  Social History     Socioeconomic History    Marital status:      Spouse name: Not on file    Number of children: Not on file    Years of education: Not on file    Highest education level: Bachelor's degree (e.g., BA, AB, BS)   Occupational History    Not on file   Tobacco Use    Smoking status: Never    Smokeless tobacco: Not on file   Vaping Use    Vaping Use: Never used   Substance and Sexual Activity    Alcohol use: Yes     Comment: OCC    Drug use: Never    Sexual activity: Not on file   Other Topics Concern    Not on file   Social History Narrative    Not on file     Social Determinants of Health     Financial Resource Strain: Low Risk  (6/26/2022)    Overall Financial Resource Strain (CARDIA)     Difficulty of Paying Living Expenses: Not hard at all   Food Insecurity: No Food Insecurity (6/26/2022)    Hunger Vital Sign     Worried About Running " "Out of Food in the Last Year: Never true     Ran Out of Food in the Last Year: Never true   Transportation Needs: No Transportation Needs (6/26/2022)    PRAPARE - Transportation     Lack of Transportation (Medical): No     Lack of Transportation (Non-Medical): No   Physical Activity: Insufficiently Active (6/26/2022)    Exercise Vital Sign     Days of Exercise per Week: 4 days     Minutes of Exercise per Session: 30 min   Stress: No Stress Concern Present (6/26/2022)    [employer] - Occupational Stress Questionnaire     Feeling of Stress : Only a little   Social Connections: Moderately Isolated (6/26/2022)    Social Connection and Isolation Panel [NHANES]     Frequency of Communication with Friends and Family: Three times a week     Frequency of Social Gatherings with Friends and Family: Once a week     Attends [employer] Services: Never     Active Member of Clubs or Organizations: No     Attends Club or Organization Meetings: Patient refused     Marital Status:    Intimate Partner Violence: Not on file   Housing Stability: Low Risk  (6/26/2022)    Housing Stability Vital Sign     Unable to Pay for Housing in the Last Year: No     Number of Places Lived in the Last Year: 2     Unstable Housing in the Last Year: No     ALLERGIES: Patient has no known allergies.  MEDICATIONS:  No current outpatient medications on file.     No current facility-administered medications for this visit.    \"CURRENT RX\"    REVIEW OF SYSTEMS:  Constitutional: Denies weight loss, endorses chronic daytime fatigue.  See HPI      PHYSICAL EXAM/VITALS:  Ht 1.753 m (5' 9\")   Wt 78.5 kg (173 lb)   BMI 25.55 kg/m²   Appearance: Well-nourished, well-developed,  looks stated age, no acute distress  Eyes:   EOMI  ENMT:  WNL  Neck: Supple, trachea midline  Respiratory effort:  No intercostal retractions or use of accessory muscles  Musculoskeletal:  Grossly normal; gait and station normal  Neurologic:  oriented to " person, time, place, and purpose; judgement intact  Psychiatric:  No depression, anxiety, agitation      MEDICAL DECISION MAKING:  The medical record was reviewed as it pertains to this referral. This includes records from primary care,consultants notes, referral request, hospital records, labs and imaging. Any available diagnostic and titration nocturnal polysomnograms, home sleep apnea tests, continuous nocturnal oximetry results, multiple sleep latency tests, and recent compliance reports were reviewed with the patient.    ASSESSMENT/PLAN:  Kenyetta Shelton is a 39 y.o.female who has a normal HST study in spite of having severe symptoms of BRITTANIE.  It is well known that HSTs often underestimate sleep apnea in women. A negative HST should be followed by a laboratory sleep study (polysomnography) if the clinical suspicion for sleep apnea is high. In women, the common co-occurrence of insomnia and sleep apnea may increase the likelihood of a false negative home sleep study. In the meantime, the patient was advised to avoid sleeping supine.  She was recommended to use the Apnea shield.  She was also recommended to download snore lab.  This patient will need to be retested with a PSG to determine if she actually has BRITTANIE.    DIAGNOSES :    1. Positional sleep apnea        The risks of untreated sleep apnea were discussed with the patient at length. Patients with BRITTANIE are at increased risk of cardiovascular disease including coronary artery disease, systemic arterial hypertension, pulmonary arterial hypertension, cardiac arrhythmias, and stroke. BRITTANIE patients have an increased risk of motor vehicle accidents, type 2 diabetes, chronic kidney disease, and non-alcoholic liver disease. The patient was advised to avoid driving a motor vehicle when drowsy.  Have advised the patient to follow up with the appropriate healthcare practitioners for all other medical problems and issues.    RETURN TO CLINIC: Return in about 3 weeks  (around 2/16/2024) for 3 weeks after ss to discuss results -DR. Ferro.    My total time spent caring for the patient on the day of the encounter was 40 minutes. This includes time spent on a thorough chart review including other physician notes, all sleep studies, as well as critical labs and pulmonary and cardiac studies.  Additionally, it includes a thorough discussion of good sleep hygiene and stimulus control, as well as  the need for consistency in terms of sleep preparation and practice.    Please note that this dictation was created using voice recognition software.  I have made every reasonable attempt to correct obvious errors, I expect that there are errors of grammar and possibly content that I did not discover before finalizing this note.

## 2024-02-01 ENCOUNTER — PATIENT MESSAGE (OUTPATIENT)
Dept: MEDICAL GROUP | Facility: LAB | Age: 39
End: 2024-02-01
Payer: COMMERCIAL

## 2024-02-01 DIAGNOSIS — Z12.31 ENCOUNTER FOR SCREENING MAMMOGRAM FOR MALIGNANT NEOPLASM OF BREAST: ICD-10-CM

## 2024-03-05 ENCOUNTER — APPOINTMENT (OUTPATIENT)
Dept: PHYSICAL MEDICINE AND REHAB | Facility: MEDICAL CENTER | Age: 39
End: 2024-03-05
Payer: COMMERCIAL

## 2024-07-13 ENCOUNTER — PATIENT MESSAGE (OUTPATIENT)
Dept: MEDICAL GROUP | Facility: LAB | Age: 39
End: 2024-07-13
Payer: COMMERCIAL

## 2024-07-13 DIAGNOSIS — B35.1 ONYCHOMYCOSIS: ICD-10-CM

## 2024-07-16 ENCOUNTER — TELEPHONE (OUTPATIENT)
Dept: MEDICAL GROUP | Facility: LAB | Age: 39
End: 2024-07-16
Payer: COMMERCIAL

## 2024-07-16 DIAGNOSIS — B35.1 ONYCHOMYCOSIS: ICD-10-CM

## 2024-07-17 RX ORDER — CICLOPIROX 80 MG/ML
1 SOLUTION TOPICAL NIGHTLY
Qty: 6.6 ML | Refills: 1 | Status: SHIPPED | OUTPATIENT
Start: 2024-07-17

## 2024-07-22 ENCOUNTER — PATIENT MESSAGE (OUTPATIENT)
Dept: MEDICAL GROUP | Facility: LAB | Age: 39
End: 2024-07-22
Payer: COMMERCIAL

## 2024-08-19 ENCOUNTER — APPOINTMENT (OUTPATIENT)
Dept: RADIOLOGY | Facility: MEDICAL CENTER | Age: 39
End: 2024-08-19
Attending: PHYSICIAN ASSISTANT
Payer: COMMERCIAL

## 2024-08-19 DIAGNOSIS — Z12.31 ENCOUNTER FOR SCREENING MAMMOGRAM FOR MALIGNANT NEOPLASM OF BREAST: ICD-10-CM

## 2024-08-19 PROCEDURE — 77067 SCR MAMMO BI INCL CAD: CPT

## 2024-09-17 ENCOUNTER — PATIENT MESSAGE (OUTPATIENT)
Dept: MEDICAL GROUP | Facility: LAB | Age: 39
End: 2024-09-17
Payer: COMMERCIAL

## 2024-09-17 DIAGNOSIS — B35.1 ONYCHOMYCOSIS: ICD-10-CM

## 2025-02-03 DIAGNOSIS — B35.1 ONYCHOMYCOSIS: ICD-10-CM

## 2025-02-03 RX ORDER — CICLOPIROX 80 MG/ML
1 SOLUTION TOPICAL NIGHTLY
Qty: 6.6 ML | Refills: 1 | Status: SHIPPED | OUTPATIENT
Start: 2025-02-03
